# Patient Record
Sex: FEMALE | Race: WHITE
[De-identification: names, ages, dates, MRNs, and addresses within clinical notes are randomized per-mention and may not be internally consistent; named-entity substitution may affect disease eponyms.]

---

## 2023-08-28 ENCOUNTER — HOSPITAL ENCOUNTER (EMERGENCY)
Dept: HOSPITAL 97 - ER | Age: 78
Discharge: HOME | End: 2023-08-28
Payer: MEDICARE

## 2023-08-28 VITALS — TEMPERATURE: 97.2 F

## 2023-08-28 VITALS — OXYGEN SATURATION: 99 % | DIASTOLIC BLOOD PRESSURE: 64 MMHG | SYSTOLIC BLOOD PRESSURE: 124 MMHG

## 2023-08-28 DIAGNOSIS — J12.82: ICD-10-CM

## 2023-08-28 DIAGNOSIS — Z95.1: ICD-10-CM

## 2023-08-28 DIAGNOSIS — I10: ICD-10-CM

## 2023-08-28 DIAGNOSIS — U07.1: Primary | ICD-10-CM

## 2023-08-28 DIAGNOSIS — J44.9: ICD-10-CM

## 2023-08-28 LAB
BUN BLD-MCNC: 13 MG/DL (ref 7–18)
GLUCOSE SERPLBLD-MCNC: 111 MG/DL (ref 74–106)
HCT VFR BLD CALC: 40.8 % (ref 36–45)
INR BLD: 1.07
LYMPHOCYTES # SPEC AUTO: 1.2 K/UL (ref 0.7–4.9)
MAGNESIUM SERPL-MCNC: 1.9 MG/DL (ref 1.6–2.4)
MCV RBC: 90.2 FL (ref 80–100)
NT-PROBNP SERPL-MCNC: 478 PG/ML (ref ?–450)
PMV BLD: 8.7 FL (ref 7.6–11.3)
POTASSIUM SERPL-SCNC: 3.2 MEQ/L (ref 3.5–5.1)
RBC # BLD: 4.52 M/UL (ref 3.86–4.86)
TROPONIN I SERPL HS-MCNC: 30.9 PG/ML (ref ?–58.9)
WBC # BLD AUTO: 4.1 THOU/UL (ref 4.3–10.9)

## 2023-08-28 PROCEDURE — 99284 EMERGENCY DEPT VISIT MOD MDM: CPT

## 2023-08-28 PROCEDURE — 83880 ASSAY OF NATRIURETIC PEPTIDE: CPT

## 2023-08-28 PROCEDURE — 93005 ELECTROCARDIOGRAM TRACING: CPT

## 2023-08-28 PROCEDURE — 85025 COMPLETE CBC W/AUTO DIFF WBC: CPT

## 2023-08-28 PROCEDURE — 71046 X-RAY EXAM CHEST 2 VIEWS: CPT

## 2023-08-28 PROCEDURE — 80048 BASIC METABOLIC PNL TOTAL CA: CPT

## 2023-08-28 PROCEDURE — 71275 CT ANGIOGRAPHY CHEST: CPT

## 2023-08-28 PROCEDURE — 84484 ASSAY OF TROPONIN QUANT: CPT

## 2023-08-28 PROCEDURE — 85610 PROTHROMBIN TIME: CPT

## 2023-08-28 PROCEDURE — 36415 COLL VENOUS BLD VENIPUNCTURE: CPT

## 2023-08-28 PROCEDURE — 83735 ASSAY OF MAGNESIUM: CPT

## 2023-08-28 NOTE — RAD REPORT
EXAM DESCRIPTION:  CT - Chest For Pe Angio - 8/28/2023 3:04 pm

 

CLINICAL HISTORY:  Chest pain.

SOB

 

COMPARISON:  <Comparisons>

 

TECHNIQUE:  CT angiogram of the pulmonary arteries was performed with MIP.

 

All CT scans are performed using dose optimization technique as appropriate and may include automated
 exposure control or mA/KV adjustment according to patient size.

 

FINDINGS:  No evidence of pulmonary thromboembolism.

 

No acute aortic finding demonstrated. Aortic atherosclerosis.

 

Mild COPD. 19 x 16 mm noncalcified nodule in the left lung base. 12 x 8 mm noncalcified nodule in the
 right middle lobe.

 

No significant pericardial or pleural fluid.

 

Small hiatal hernia. Sternotomy wires.

 

IMPRESSION:  No evidence of pulmonary thromboembolism.

 

Noncalcified bilateral pulmonary nodules as detailed. These could be neoplastic or metastatic. Recomm
end follow-up PET-CT to evaluate for metabolic activity. Protopic Counseling: Patient may experience a mild burning sensation during topical application. Protopic is not approved in children less than 2 years of age. There have been case reports of hematologic and skin malignancies in patients using topical calcineurin inhibitors although causality is questionable.

## 2023-08-28 NOTE — ER
Nurse's Notes                                                                                     

 [facility]                                                                 

Name: Mary Ellen Faria                                                                             

Age: 77 yrs                                                                                       

Sex: Female                                                                                       

: 1945                                                                                   

MRN: [id number]                                                                                   

Arrival Date: 2023                                                                          

Time: 11:19                                                                                       

Account#: [id number]                                                                            

Bed 12                                                                                            

Private MD:                                                                                       

Diagnosis: Pneumonia due to SARS-associated coronavirus                                           

                                                                                                  

Presentation:                                                                                     

                                                                                             

11:25 Chief complaint: Patient states: "The last 3 days, I couldn't get out of bed and just   mb9 

      feel awful. I ordered a COVID test and it came back positive today. I have N/V/D and        

      feel SOB when walking.". Coronavirus screen: Vaccine status: Patient reports receiving      

      the 2nd dose of the covid vaccine. Ebola Screen: No symptoms or risks identified at         

      this time. Initial Sepsis Screen: Does the patient meet any 2 criteria? No. Patient's       

      initial sepsis screen is negative. Does the patient have a suspected source of              

      infection? No. Patient's initial sepsis screen is negative. Risk Assessment: Do you         

      want to hurt yourself or someone else? Patient reports no desire to harm self or            

      others. Onset of symptoms was 2023.                                              

11:25 Method Of Arrival: Ambulatory                                                           mb9 

11:25 Acuity: JIAN 3                                                                           mb9 

                                                                                                  

Triage Assessment:                                                                                

11:29 General: Appears in no apparent distress. Behavior is calm, cooperative. Pain:          mb9 

      Complains of pain in entire body. Neuro: Level of Consciousness is awake, alert, obeys      

      commands, Oriented to person, place, time, situation, Appropriate for age.                  

      Cardiovascular: Patient's skin is warm and dry. Respiratory: Reports shortness of           

      breath cough that is Breath sounds are clear bilaterally. Respiratory: Onset: The           

      symptoms/episode began/occurred gradually, the patient has mild shortness of breath.        

      GI: Reports diarrhea, nausea, vomiting. Derm: Skin is pink, warm \T\ dry.                   

                                                                                                  

Historical:                                                                                       

- Allergies:                                                                                      

11:27 No Known Allergies;                                                                     mb9 

- Home Meds:                                                                                      

11:27 Metformin Oral [Active]; Metoprolol Tartrate Oral [Active];                             mb9 

- PMHx:                                                                                           

11:27 Chronic obstructive lung disease; Hypertensive disorder; Diabetes mellitus;             mb9 

- PSHx:                                                                                           

11:27 Appendectomy; Cholecystectomy; Triple bypass;                                           mb9 

                                                                                                  

- Immunization history:: Adult Immunizations up to date.                                          

- Social history:: Smoking status: Patient/guardian denies using tobacco.                         

                                                                                                  

                                                                                                  

Screenin:34 [facility] ED Fall Risk Assessment (Adult) History of falling in the last 3 months,       me1 

      including since admission No falls in past 3 months (0 pts) Confusion or Disorientation     

      No (0 pts) Intoxicated or Sedated No (0 pts) Impaired Gait No (0 pts) Mobility Assist       

      Device Used No (0 pt) Altered Elimination No (0 pt) Score/Fall Risk Level 0 - 2 = Low       

      Risk. Abuse screen: Denies threats or abuse. Nutritional screening: No deficits noted.      

      Tuberculosis screening: No symptoms or risk factors identified.                             

                                                                                                  

Assessment:                                                                                       

16:34 General: Appears uncomfortable, well groomed, well developed, well nourished, Behavior  me1 

      is calm, cooperative, appropriate for age. General: Reports chills for fever for            

      feeling ill for fatigue for she hasn't felt like getting oob x 3 days. Reports n/v/d        

      and sob with exertion. States she took an at home covid test that was positive. Pain:       

      Denies pain. Neuro: Level of Consciousness is awake, alert, obeys commands, Oriented to     

      person, place, time, situation, Appropriate for age. Cardiovascular: Capillary refill <     

      3 seconds Patient's skin is warm and dry. Respiratory: Reports shortness of breath          

      cough that is pain with cough Airway is patent Respiratory effort is even, unlabored,       

      Respiratory pattern is regular, symmetrical. GI: Reports diarrhea, nausea, vomiting,        

      since saturday.                                                                             

                                                                                                  

Vital Signs:                                                                                      

11:25  / 70; Pulse 85; Resp 18; Temp 97.2; Pulse Ox 94% on R/A; Weight 68.04 kg; Height mb9 

      5 ft. 5 in. ; Pain 0/10;                                                                    

16:43  / 64; Pulse 67; Resp 18; Pulse Ox 99% on R/A;                                    me1 

11:25 Body Mass Index 24.96 (68.04 kg, 165.1 cm)                                              mb9 

11:25 Pain Scale: Adult                                                                       mb9 

                                                                                                  

ED Course:                                                                                        

11:23 Patient arrived in ED.                                                                  mg5 

11:25 Leon Stahl PA is PHCP.                                                                cp  

11:25 Marcelino Raymond MD is Attending Physician.                                                cp  

11:25 Arm band placed on.                                                                     mb9 

11:27 Triage completed.                                                                       mb9 

12:25 Basic Metabolic Panel Sent.                                                             bc6 

12:25 Magnesium Sent.                                                                         bc6 

12:25 NT PRO-BNP Sent.                                                                        bc6 

12:25 Troponin HS Sent.                                                                       bc6 

12:25 Inserted saline lock: 22 gauge in right antecubital area, using aseptic technique.      bc6 

      Blood collected.                                                                            

12:31 XRAY Chest Pa And Lat (2 Views) In Process Unspecified.                                 EDMS

15:06 CT Chest For PE Angio In Process Unspecified.                                           EDMS

16:29 Adriana Sam, RN is Primary Nurse.                                                me1 

16:34 Patient has correct armband on for positive identification. Bed in low position. Call   me1 

      light in reach. Side rails up X 1. Provided Education on: POC. Verbalized                   

      understanding. .                                                                            

16:34 No provider procedures requiring assistance completed. IV is patent, is intact, with    me1 

      fluids infusing freely, with good blood return.                                             

17:01 IV discontinued, intact, bleeding controlled, No redness/swelling at site. Pressure     mb9 

      dressing applied.                                                                           

                                                                                                  

Administered Medications:                                                                         

16:33 Drug: Potassium PO Effervescent Tablet 50 mEq Route: PO;                                me1 

                                                                                                  

                                                                                                  

Medication:                                                                                       

16:34 VIS not applicable for this client.                                                     me1 

                                                                                                  

Outcome:                                                                                          

16:46 Discharge ordered by MD.                                                                oc  

17:01 Discharged to home ambulatory.                                                          mb9 

17:01 Condition: stable                                                                           

17:01 Discharge instructions given to patient, Instructed on discharge instructions, follow       

      up and referral plans. Demonstrated understanding of instructions, follow-up care,          

      medications, Prescriptions given X 4.                                                       

17:01 Patient left the ED.                                                                    mb9 

                                                                                                  

Signatures:                                                                                       

Dispatcher [employer]                           EDMS                                                 

Leon Stahl PA PA cp Breneman, Mary Beth RN                 RN   mb9                                                  

Barbara Abdul                           bc6                                                  

Adriana Sam, HARPER                  RN   me1                                                  

Kristi Pearson                             mg5                                                  

                                                                                                  

**************************************************************************************************

## 2023-08-28 NOTE — EDPHYS
Physician Documentation                                                                           

 [facility]                                                                 

Name: Mary Ellen Faria                                                                             

Age: 77 yrs                                                                                       

Sex: Female                                                                                       

: 1945                                                                                   

MRN: [id number]                                                                                   

Arrival Date: 2023                                                                          

Time: 11:19                                                                                       

Account#: [id number]                                                                            

Bed 12                                                                                            

Private MD:                                                                                       

ED Physician Marcelino Raymond                                                                         

HPI:                                                                                              

                                                                                             

11:45 This 77 yrs old Female presents to ER via Ambulatory with complaints of Positive covid, cp  

      Shortness Of Breath.                                                                        

11:45 The patient has shortness of breath with light activity.                                cp  

11:45 Patient is a 77-year-old female who presents to the emergency department with           cp  

      complaints of shortness of breath, cough. Patient reports she has been feeling ill for      

      the last 3 days and tested herself for COVID which returned positive today. Patient has     

      not measured any fevers. Patient complains of some nausea, vomiting and diarrhea.           

                                                                                                  

Historical:                                                                                       

- Allergies:                                                                                      

11:27 No Known Allergies;                                                                     mb9 

- Home Meds:                                                                                      

11:27 Metformin Oral [Active]; Metoprolol Tartrate Oral [Active];                             mb9 

- PMHx:                                                                                           

11:27 Chronic obstructive lung disease; Hypertensive disorder; Diabetes mellitus;             mb9 

- PSHx:                                                                                           

11:27 Appendectomy; Cholecystectomy; Triple bypass;                                           mb9 

                                                                                                  

- Immunization history:: Adult Immunizations up to date.                                          

- Social history:: Smoking status: Patient/guardian denies using tobacco.                         

                                                                                                  

                                                                                                  

ROS:                                                                                              

11:50 Constitutional: Positive for body aches, Negative for fever, poor PO intake.            cp  

11:50 Eyes: Negative for injury, pain, redness, and discharge.                                cp  

11:50 ENT: Negative for drainage from ear(s), ear pain, difficulty swallowing, difficulty         

      handling secretions.                                                                        

11:50 Cardiovascular: Negative for chest pain, edema, palpitations.                               

11:50 Respiratory: Positive for cough, shortness of breath, on exertion.                          

11:50 Abdomen/GI: Positive for nausea and vomiting, diarrhea, Negative for constipation,          

      black/tarry stool, rectal bleeding.                                                         

11:50 Neuro: Positive for weakness, Negative for altered mental status, dizziness, headache,      

      loss of consciousness, syncope.                                                             

11:50 All other systems are negative.                                                             

                                                                                                  

Exam:                                                                                             

11:55 Constitutional: The patient appears in no acute distress, alert, awake,                 cp  

      non-diaphoretic, non-toxic, well developed, well nourished.                                 

11:55 Head/Face:  Normocephalic, atraumatic.                                                  cp  

11:55 Eyes: Periorbital structures: appear normal, Conjunctiva: normal, no exudate, no            

      injection, Sclera: no appreciated abnormality, Lids and lashes: appear normal,              

      bilaterally.                                                                                

11:55 ENT: External ear(s): are unremarkable, Ear canal(s): are normal, clear, TM's:              

      dullness, bilaterally, Nose: is normal, Mouth: Lips: moist, Oral mucosa: pink and           

      intact, moist, Posterior pharynx: is normal, airway is patent, no erythema, no exudate.     

11:55 Neck: ROM/movement: is normal, is supple, without pain, no range of motions                 

      limitations, no meningismus.                                                                

11:55 Chest/axilla: Inspection: normal.                                                           

11:55 Cardiovascular: Rate: normal, Rhythm: regular, Edema: is not appreciated, JVD: is not       

      appreciated.                                                                                

11:55 Respiratory: the patient does not display signs of respiratory distress,  Respirations:     

      labored breathing, is not present, shallow respirations, that is mild, Breath sounds:       

      are clear throughout, no decreased breath sounds, no stridor, no wheezing.                  

11:55 Abdomen/GI: Inspection: abdomen appears normal, Palpation: abdomen is soft and              

      non-tender, in all quadrants.                                                               

11:55 Back: ROM is normal.                                                                        

11:55 Neuro: Orientation: to person, place \T\ time. Mentation: is normal, Motor: moves all       

      fours, strength is normal, Sensation: is normal, Gait: is steady, at a normal pace,         

      without difficulty.                                                                         

12:28 ECG was reviewed by the Attending Physician.                                              

                                                                                                  

Vital Signs:                                                                                      

11:25  / 70; Pulse 85; Resp 18; Temp 97.2; Pulse Ox 94% on R/A; Weight 68.04 kg; Height mb9 

      5 ft. 5 in. ; Pain 0/10;                                                                    

16:43  / 64; Pulse 67; Resp 18; Pulse Ox 99% on R/A;                                    me1 

11:25 Body Mass Index 24.96 (68.04 kg, 165.1 cm)                                              mb9 

11:25 Pain Scale: Adult                                                                       mb9 

                                                                                                  

MDM:                                                                                              

11:31 Patient medically screened.                                                             ci  

16:45 Data reviewed: vital signs, nurses notes, lab test result(s), EKG, radiologic studies,  cp  

      CT scan, plain films.                                                                       

16:45 Differential diagnosis: Bronchitis Myocardial Infarction pneumonia, Pneumothorax        cp  

      pulmonary edema, Pulmonary Embolism Sepsis Unstable Angina. Counseling: I had a             

      detailed discussion with the patient and/or guardian regarding the historical points,       

      exam findings, and any diagnostic results supporting the discharge/admit diagnosis, lab     

      results, radiology results, the need for outpatient follow up, a family practitioner,       

      to return to the emergency department if symptoms worsen or persist or if there are any     

      questions or concerns that arise at home.                                                   

                                                                                                  

                                                                                             

11:35 Order name: Basic Metabolic Panel; Complete Time: 13:58                                 cp  

                                                                                             

13:58 Interpretation: Normal except: ; K 3.2; GLUC 111; CRE 1.06; GFR 54.               cp  

                                                                                             

11:35 Order name: CBC with Diff; Complete Time: 13:58                                         cp  

                                                                                             

13:58 Interpretation: Normal except: WBC 4.10; MN% 17.4.                                      cp  

                                                                                             

11:35 Order name: Magnesium; Complete Time: 13:58                                             cp  

                                                                                             

11:35 Order name: NT PRO-BNP; Complete Time: 13:58                                            cp  

                                                                                             

11:35 Order name: PT-INR; Complete Time: 13:58                                                cp  

                                                                                             

11:35 Order name: Troponin HS; Complete Time: 13:58                                           cp  

                                                                                             

11:35 Order name: XRAY Chest Pa And Lat (2 Views); Complete Time: 13:58                       cp  

                                                                                             

14:01 Order name: CT Chest For PE Angio; Complete Time: 15:49                                 cp  

                                                                                             

11:35 Order name: EKG; Complete Time: 11:36                                                   cp  

                                                                                             

11:35 Order name: Cardiac monitoring; Complete Time: 17:01                                    cp  

                                                                                             

11:35 Order name: EKG - Nurse/Tech; Complete Time: 12:25                                      cp  

                                                                                             

11:35 Order name: IV Saline Lock; Complete Time: 12:25                                        cp  

                                                                                             

11:35 Order name: Labs collected and sent; Complete Time: 12:25                               cp  

                                                                                             

11:35 Order name: O2 Per Protocol; Complete Time: 17:01                                       cp  

                                                                                             

11:35 Order name: O2 Sat Monitoring; Complete Time: 17:01                                     cp  

                                                                                                  

EC:28 Rate is 74 beats/min. Rhythm is regular. ME interval is normal. QRS interval is normal. cp  

      QT interval is normal. T waves are Inverted in lead aVR. Interpreted by me. Reviewed by     

      me.                                                                                         

                                                                                                  

Administered Medications:                                                                         

16:33 Drug: Potassium PO Effervescent Tablet 50 mEq Route: PO;                                me1 

                                                                                                  

                                                                                                  

Disposition Summary:                                                                              

23 16:46                                                                                    

Discharge Ordered                                                                                 

      Location: Home                                                                          cp  

      Problem: new                                                                            cp  

      Symptoms: have improved                                                                 cp  

      Condition: Stable                                                                       cp  

      Diagnosis                                                                                   

        - Pneumonia due to SARS-associated coronavirus                                        cp  

      Followup:                                                                               cp  

        - With: Private Physician                                                                  

        - When: 2 - 3 days                                                                         

        - Reason: Recheck today's complaints                                                       

      Discharge Instructions:                                                                     

        - Discharge Summary Sheet                                                             cp  

        - Aspirin and Your Heart                                                              cp  

        - COVID-19                                                                            cp  

        - 10 Things You Can Do to Manage Your COVID-19 Symptoms at Home - [facility] (2021)    cp  

      Forms:                                                                                      

        - Medication Reconciliation Form                                                      cp  

        - Thank You Letter                                                                    cp  

        - Antibiotic Education                                                                cp  

        - Prescription Opioid Use                                                             cp  

        - Patient Portal Instructions                                                         cp  

        - Leadership Thank You Letter                                                         cp  

      Prescriptions:                                                                              

        - Paxlovid 150-100 mg Oral Tablet, Dose Pack                                               

            - take 1 dose pack by ORAL route per package directions for 5 days; 30 tablet;    cp  

      Refills: 0, Product Selection Permitted                                                     

        - albuterol sulfate 90 mcg/actuation Inhalation HFA Aerosol Inhaler                        

            - inhale 1 puff by INHALATION route every 4-6 hours administer via ventilator; 1  cp  

      unit; Refills: 0, Product Selection Permitted                                               

        - Zofran 4 mg Oral Tablet                                                                  

            - take 1 tablet by ORAL route every 12 hours As needed; 20 tablet; Refills: 0,    cp  

      Product Selection Permitted                                                                 

        - Zithromax Z-Tom 250 mg Oral Tablet                                                       

            - take 1 tablet by ORAL route as directed for 5 days Day 1 - take two (2) tablets cp  

      one time.  Day 2, 3, 4 , 5 take one (1) tablet once daily.; 6 tablet; Refills: 0,           

      Product Selection Permitted                                                                 

Signatures:                                                                                       

Dispatcher [employer]                           EDMS                                                 

Leon Stahl PA PA   cp                                                   

Wanda Johnson RN                 RN   mb9                                                  

Adriana Sam RN                  RN   me1                                                  

Estevan Wolfe                                                   

                                                                                                  

Corrections: (The following items were deleted from the chart)                                    

                                                                                             

16:53  11:50 Abdomen/GI: Positive for nausea and vomiting, Negative for diarrhea,        cp  

      constipation, cp                                                                            

                                                                                                  

**************************************************************************************************

## 2023-08-28 NOTE — RAD REPORT
EXAM DESCRIPTION:  RAD - Chest Pa And Lat (2 Views) - 8/28/2023 12:31 pm

 

CLINICAL HISTORY:  Cough;SOB

Chest pain.

 

COMPARISON:  Chest Pa And Lat (2 Views) dated 5/24/2021; Chest Single View dated 3/12/2018; CHEST PA 
AND LAT 2 VIEW dated 4/29/2015; CHEST PA AND LAT 2 VIEW dated 12/12/2013

 

TECHNIQUE:  PA and lateral views of the chest were obtained.

 

FINDINGS:  The lungs are hyperexpanded compatible with COPD. Slight haziness in the right lung base l
aterally may represent early infiltrate. The heart is upper limit of normal in size. Sternotomy wires
.

 

IMPRESSION:  COPD is noted. Hazy basilar opacity particularly on the right could indicate early pneum
onia.

 

The USPSTF recommends annual screening for lung cancer with low-dose CT (LDCT) in adults aged 50 to 8
0 years who have a 20 pack-year smoking history and currently smoke or have quit within the past 15 y
ears.

## 2023-08-29 NOTE — EKG
Test Date:    2023-08-28               Test Time:    12:22:39

Technician:   AMIRA                                    

                                                     

MEASUREMENT RESULTS:                                       

Intervals:                                           

Rate:         74                                     

TN:           138                                    

QRSD:         74                                     

QT:           412                                    

QTc:          457                                    

Axis:                                                

P:            66                                     

TN:           138                                    

QRS:          52                                     

T:            73                                     

                                                     

INTERPRETIVE STATEMENTS:                                       

                                                     

Normal sinus rhythm

Nonspecific ST and T wave abnormality

Abnormal ECG

Compared to ECG 05/16/2012 12:26:40

ST (T wave) deviation now present

T-wave abnormality no longer present



Electronically Signed On 08-29-23 16:27:37 CDT by Eduardo Parish